# Patient Record
Sex: FEMALE | Race: WHITE | NOT HISPANIC OR LATINO | Employment: FULL TIME | URBAN - METROPOLITAN AREA
[De-identification: names, ages, dates, MRNs, and addresses within clinical notes are randomized per-mention and may not be internally consistent; named-entity substitution may affect disease eponyms.]

---

## 2017-09-21 ENCOUNTER — OFFICE VISIT (OUTPATIENT)
Dept: URGENT CARE | Facility: PHYSICIAN GROUP | Age: 47
End: 2017-09-21
Payer: COMMERCIAL

## 2017-09-21 VITALS
HEART RATE: 62 BPM | SYSTOLIC BLOOD PRESSURE: 92 MMHG | TEMPERATURE: 98.6 F | OXYGEN SATURATION: 96 % | DIASTOLIC BLOOD PRESSURE: 68 MMHG | WEIGHT: 212 LBS | RESPIRATION RATE: 16 BRPM

## 2017-09-21 DIAGNOSIS — R07.89 CHEST WALL PAIN: ICD-10-CM

## 2017-09-21 DIAGNOSIS — M94.0 COSTOCHONDRITIS: ICD-10-CM

## 2017-09-21 PROCEDURE — 99203 OFFICE O/P NEW LOW 30 MIN: CPT | Performed by: FAMILY MEDICINE

## 2017-09-21 RX ORDER — KETOROLAC TROMETHAMINE 30 MG/ML
60 INJECTION, SOLUTION INTRAMUSCULAR; INTRAVENOUS ONCE
Status: COMPLETED | OUTPATIENT
Start: 2017-09-21 | End: 2017-09-21

## 2017-09-21 RX ADMIN — KETOROLAC TROMETHAMINE 60 MG: 30 INJECTION, SOLUTION INTRAMUSCULAR; INTRAVENOUS at 11:16

## 2017-09-21 ASSESSMENT — ENCOUNTER SYMPTOMS
PALPITATIONS: 0
ABDOMINAL PAIN: 0

## 2017-09-21 ASSESSMENT — PAIN SCALES - GENERAL: PAINLEVEL: 3=SLIGHT PAIN

## 2017-09-21 NOTE — PROGRESS NOTES
Subjective:      Abi Valdez is a 47 y.o. female who presents with Breast Pain (x9 days. Sharp pains under Lt breast. Aggravated by breathing deep, moving.)            9 days left anterior chest wall/rib pain worse with deep inspiration and movement. No cough. No trauma. No SOB/wheeze. No fever. Pain is severe with sneeze. Has slightly improved today. OTC Aleve with some relief. No other aggravating or alleviating factors.      Preceded by working out/gym.     Review of Systems   Cardiovascular: Negative for palpitations and leg swelling.   Gastrointestinal: Negative for abdominal pain.   Skin: Negative for rash.   .  Medications, Allergies, and current problem list reviewed today in Epic         Objective:     BP (!) 92/68   Pulse 62   Temp 37 °C (98.6 °F)   Resp 16   Wt 96.2 kg (212 lb)   SpO2 96%   Breastfeeding? No      Physical Exam   Constitutional: She appears well-developed and well-nourished. No distress.   Cardiovascular: Normal rate, regular rhythm and normal heart sounds.    Pulmonary/Chest: Effort normal and breath sounds normal. She has no wheezes.       Neurological:   Speech is clear. Patient is appropriate and cooperative.     Skin: Skin is warm and dry. No rash noted.               Assessment/Plan:     1. Chest wall pain  ketorolac (TORADOL) injection 60 mg   2. Costochondritis  ketorolac (TORADOL) injection 60 mg     Differential diagnosis, natural history, supportive care, and indications for immediate follow-up discussed at length.   Continue nsaid as needed

## 2017-09-21 NOTE — LETTER
September 21, 2017         Patient: Abi Valdez   YOB: 1970   Date of Visit: 9/21/2017           To Whom it May Concern:    Abi Valdez was seen in my clinic on 9/21/2017. Please excuse today. May return to next scheduled shift without restrictions.       Sincerely,           Yang Cabrera M.D.  Electronically Signed